# Patient Record
Sex: FEMALE | Race: WHITE | NOT HISPANIC OR LATINO | URBAN - METROPOLITAN AREA
[De-identification: names, ages, dates, MRNs, and addresses within clinical notes are randomized per-mention and may not be internally consistent; named-entity substitution may affect disease eponyms.]

---

## 2020-02-07 ENCOUNTER — EMERGENCY (EMERGENCY)
Facility: HOSPITAL | Age: 65
LOS: 1 days | Discharge: HOME | End: 2020-02-07
Attending: EMERGENCY MEDICINE | Admitting: EMERGENCY MEDICINE
Payer: COMMERCIAL

## 2020-02-07 VITALS
SYSTOLIC BLOOD PRESSURE: 118 MMHG | HEART RATE: 59 BPM | OXYGEN SATURATION: 99 % | RESPIRATION RATE: 18 BRPM | TEMPERATURE: 98 F | DIASTOLIC BLOOD PRESSURE: 78 MMHG

## 2020-02-07 VITALS
OXYGEN SATURATION: 99 % | DIASTOLIC BLOOD PRESSURE: 81 MMHG | RESPIRATION RATE: 18 BRPM | HEART RATE: 82 BPM | SYSTOLIC BLOOD PRESSURE: 143 MMHG | TEMPERATURE: 98 F

## 2020-02-07 DIAGNOSIS — T78.40XA ALLERGY, UNSPECIFIED, INITIAL ENCOUNTER: ICD-10-CM

## 2020-02-07 DIAGNOSIS — T78.1XXA OTHER ADVERSE FOOD REACTIONS, NOT ELSEWHERE CLASSIFIED, INITIAL ENCOUNTER: ICD-10-CM

## 2020-02-07 DIAGNOSIS — R11.2 NAUSEA WITH VOMITING, UNSPECIFIED: ICD-10-CM

## 2020-02-07 PROCEDURE — 93010 ELECTROCARDIOGRAM REPORT: CPT | Mod: 59

## 2020-02-07 PROCEDURE — 99284 EMERGENCY DEPT VISIT MOD MDM: CPT

## 2020-02-07 PROCEDURE — 93005 ELECTROCARDIOGRAM TRACING: CPT

## 2020-02-07 RX ORDER — DIPHENHYDRAMINE HCL 50 MG
25 CAPSULE ORAL ONCE
Refills: 0 | Status: COMPLETED | OUTPATIENT
Start: 2020-02-07 | End: 2020-02-07

## 2020-02-07 RX ORDER — FAMOTIDINE 10 MG/ML
20 INJECTION INTRAVENOUS ONCE
Refills: 0 | Status: COMPLETED | OUTPATIENT
Start: 2020-02-07 | End: 2020-02-07

## 2020-02-07 RX ORDER — FAMOTIDINE 10 MG/ML
1 INJECTION INTRAVENOUS
Qty: 4 | Refills: 0
Start: 2020-02-07

## 2020-02-07 RX ORDER — EPINEPHRINE 0.3 MG/.3ML
0.3 INJECTION INTRAMUSCULAR; SUBCUTANEOUS
Qty: 0.3 | Refills: 0
Start: 2020-02-07

## 2020-02-07 RX ADMIN — Medication 25 MILLIGRAM(S): at 20:00

## 2020-02-07 RX ADMIN — FAMOTIDINE 20 MILLIGRAM(S): 10 INJECTION INTRAVENOUS at 20:01

## 2020-02-07 NOTE — ED PROVIDER NOTE - CLINICAL SUMMARY MEDICAL DECISION MAKING FREE TEXT BOX
Pt presents s/p allergic reaction, mild. No airway involvement. Pt expresses she feels she is here against her will and forced by UC, and states she wants to go home. Added additional Benadryl and Pepcid w/ improvement in sx. Pt d/c;d w/ Rx for continued steroids, Pepcid, Benadryl prn, and Epi Pen (d/w pt when to use ). Will d/c w/ f/u allergist. Pt improved. Pt demonstrates understanding of plan and reliability.

## 2020-02-07 NOTE — ED ADULT NURSE NOTE - OBJECTIVE STATEMENT
Patient presents to the ED complaining of itching, burning to her tongue after eating a salad. Patient reports that the salad had b pollen. Denies any respiratory distress. Speaking in full sentences. Denies lip or tongue swelling. Took 2 tabs of benadryl at 5PM and 60 of prednisone about 45 minutes ago from a urgent care.

## 2020-02-07 NOTE — ED PROVIDER NOTE - NSFOLLOWUPINSTRUCTIONS_ED_ALL_ED_FT
You were evaluated in the ED after an allergic reaction. You received additional oral Benadryl (25 mg) and Pepcid (20 mg, also an antihistamine) in the ED at approximately 8pm, in addition to the previously taken Benadryl and Prednisone.     Continue the Prednisone and Pepcid (both prescribed) over the next 4 days. Use Benadryl as needed. The prescribed Epi-pen should be used only in reactions with airway involvement (lip / tongue / throat swelling, difficulty breathing, wheezing), and then you should come to the ED immediately.     You should be tested by an allergist.    Food Allergy    WHAT YOU NEED TO KNOW:    A food allergy is an immune system reaction to a food. A food allergen is an ingredient or chemical in a food that causes your immune system to react. Allergic reactions happen when your immune system fights too strongly against an allergen and causes you to get sick. Allergic reactions can happen within minutes to several hours after you eat, touch, or smell the food. You can also have a second reaction up to 8 hours later.     DISCHARGE INSTRUCTIONS:    Call 911 for signs or symptoms of anaphylaxis, such as trouble breathing, swelling in your mouth or throat, or wheezing. You may also have itching, a rash, hives, or feel like you are going to faint.    Return to the emergency department if:     Your mouth, tongue, or throat swells.      You have itching or hives that spread all over your body.    Contact your healthcare provider if:     You have new or worsening rashes, hives, or itching.      You have an upset stomach or are vomiting.      You have stomach cramps or diarrhea.      You have questions about your treatment, medicine, or care.    Medicines:     Epinephrine is used to treat severe allergic reactions such as anaphylaxis.       Antihistamines decrease mild symptoms such as itching or a rash.      Steroids: This medicine may be given to decrease inflammation.      Short-acting bronchodilators: You may need short-acting bronchodilators to help open your airways quickly. These medicines may be called rescue inhalers or relievers. They relieve sudden, severe symptoms and start to work right away.       Take your medicine as directed. Contact your healthcare provider if you think your medicine is not helping or if you have side effects. Tell him or her if you are allergic to any medicine. Keep a list of the medicines, vitamins, and herbs you take. Include the amounts, and when and why you take them. Bring the list or the pill bottles to follow-up visits. Carry your medicine list with you in case of an emergency.    Follow up with your healthcare provider as directed: You may need to see specialists for ongoing care. Your healthcare provider may want to test you regularly to see if the food allergy changes. Write down your questions so you remember to ask them during follow-up visits.    Steps to take for signs or symptoms of anaphylaxis:     Immediately give 1 shot of epinephrine only into the outer thigh muscle.       Leave the shot in place as directed. Your healthcare provider may recommend you leave it in place for up to 10 seconds before you remove it. This helps make sure all of the epinephrine is delivered.       Call 911 and go to the emergency department, even if the shot improved symptoms. Do not drive yourself. Bring the used epinephrine shot with you.     Safety precautions to take if you are at risk for anaphylaxis:     Keep 2 shots of epinephrine with you at all times. You may need a second shot, because epinephrine only works for about 20 minutes and symptoms may return. Your healthcare provider can show you and family members how to give the shot. Check the expiration date every month and replace it before it expires.      Create an action plan. Your healthcare provider can help you create a written plan that explains the allergy and an emergency plan to treat a reaction. The plan explains when to give a second epinephrine shot if symptoms return or do not improve after the first. Give copies of the action plan and emergency instructions to family members, work and school staff, and  providers. Show them how to give a shot of epinephrine. Update the plan as the allergy changes.      Be careful when you exercise. If you have had exercise-induced anaphylaxis, do not exercise right after you eat. Stop exercising right away if you start to develop any signs or symptoms of anaphylaxis. You may first feel tired, warm, or have itchy skin. Hives, swelling, and severe breathing problems may develop if you continue to exercise.      Carry medical alert identification. Wear jewelry or carry a card that says you have a food allergy. Ask your healthcare provider where to get these items.       Do not eat the food that causes your allergy. Even a small taste can cause an allergic reaction. Your healthcare provider or a dietitian can help you plan a balanced diet. Babies may need to drink a formula that does not contain milk or soy. A dietitian can teach you how to read labels for ingredients that cause your allergies.       Ask about ingredients in foods prepared outside your home. When you eat out, ask what is in the food you want to order. Ask how food is prepared. Fried foods may contain small amounts of food allergens, such as nuts and shellfish.      Use good hygiene. Do not share utensils or food. Wash your hands before and after meals.     Flu vaccine and egg allergy: Do not get the nasal spray form of the flu vaccine if you have an egg allergy. The nasal spray may contain egg proteins that can cause anaphylaxis. Ask your healthcare provider if the injection form of the vaccine is safe for you.

## 2020-02-07 NOTE — ED PROVIDER NOTE - PHYSICAL EXAMINATION
Constitutional: Well appearing, well nourished, awake, alert, oriented to person, place, time/situation and in no apparent distress.  ENMT: Airway patent. Normal MM. No erythema or exudates in oropharynx. No tongue / lip / uvula / pharyngeal / sublingual edema. No oral lesions. Uvula is midline. No drooling or stridor. Normal phonation.   Eyes: Clear bilaterally  Cardiac: Normal rate, regular rhythm.  Heart sounds S1, S2.  No murmurs, rubs or gallops.  Respiratory: Breaths sounds equal and clear b/l. No W/R/R. No increased WOB, tachypnea, hypoxia, or accessory mm use. Pt speaks in full sentences.   Gastrointestinal: Abd soft, NT, ND, NABS. No guarding, rebound, or rigidity. No pulsatile abdominal masses. No organomegaly appreciated. No CVAT   Musculoskeletal: Range of motion is not limited  Neuro: Alert and oriented x 3, face symmetric and speech fluent. Strength 5/5 x 4 ext and symmetric, nml gross motor movement, nml gait. No focal deficits noted.  Skin: Skin normal color for race, warm, dry and intact. mild erythema to b/l palms and extensor surfaces of forearms only  Psych: Alert and oriented to person, place, time/situation. normal mood and affect. no apparent risk to self or others.

## 2020-02-07 NOTE — ED ADULT TRIAGE NOTE - CHIEF COMPLAINT QUOTE
allergic reaction today possibly to bee pollen in salad   total body itching and rash to palms and wrists   given 60 mg prednisone and 50 mg bendaryl PTA

## 2020-02-07 NOTE — ED ADULT NURSE NOTE - NSIMPLEMENTINTERV_GEN_ALL_ED
Implemented All Universal Safety Interventions:  [place] to call system. Call bell, personal items and telephone within reach. Instruct patient to call for assistance. Room bathroom lighting operational. Non-slip footwear when patient is off stretcher. Physically safe environment: no spills, clutter or unnecessary equipment. Stretcher in lowest position, wheels locked, appropriate side rails in place.

## 2020-02-07 NOTE — ED PROVIDER NOTE - PATIENT PORTAL LINK FT
You can access the FollowMyHealth Patient Portal offered by [facility] by registering at the following website: [email or web address]. By joining [facility]’s FollowMyHealth portal, you will also be able to view your health information using other applications (apps) compatible with our system.

## 2020-02-07 NOTE — ED PROVIDER NOTE - OBJECTIVE STATEMENT
Pt w/ no sig PMHx, PSHx R oophorectomy p/w allergic reaction. Pt reports no known allergies. Pt ate a salad at approx 2:30 pm w/ bee pollen on it. Pt reports she immediately felt nausea, and vomited x 2, followed by burning sensation to eyes, face, tongue, and itching / redness to extremities and chest. Pt took Benadryl 50 mg at 5pm, and then went to [facility]. Pt was given Prednisone 60 mg at approx 7 pm, and advised to come to the ED. No tongue / lip / throat swelling. No voice changes. No SOB, wheezing, nor stridor. Pt reports minimal improvement. No hives, just erythema to forearms. No new soaps / detergents.
